# Patient Record
Sex: MALE | Race: WHITE | NOT HISPANIC OR LATINO | Employment: FULL TIME | URBAN - METROPOLITAN AREA
[De-identification: names, ages, dates, MRNs, and addresses within clinical notes are randomized per-mention and may not be internally consistent; named-entity substitution may affect disease eponyms.]

---
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
## 2019-07-11 ENCOUNTER — HOSPITAL ENCOUNTER (OUTPATIENT)
Dept: CARDIOLOGY | Facility: MEDICAL CENTER | Age: 19
End: 2019-07-11
Attending: INTERNAL MEDICINE
Payer: COMMERCIAL

## 2019-07-11 DIAGNOSIS — R00.2 PALPITATIONS: ICD-10-CM

## 2019-07-11 LAB
LV EJECT FRACT MOD 2C 99903: 64.54
LV EJECT FRACT MOD 4C 99902: 67.89
LV EJECT FRACT MOD BP 99901: 64.41

## 2019-07-11 PROCEDURE — 93306 TTE W/DOPPLER COMPLETE: CPT

## 2019-11-04 ENCOUNTER — HOSPITAL ENCOUNTER (OUTPATIENT)
Dept: LAB | Facility: MEDICAL CENTER | Age: 19
End: 2019-11-04
Attending: FAMILY MEDICINE
Payer: COMMERCIAL

## 2019-11-04 LAB
ALBUMIN SERPL BCP-MCNC: 4.4 G/DL (ref 3.2–4.9)
ALBUMIN/GLOB SERPL: 1.5 G/DL
ALP SERPL-CCNC: 88 U/L (ref 30–99)
ALT SERPL-CCNC: 13 U/L (ref 2–50)
ANION GAP SERPL CALC-SCNC: 7 MMOL/L (ref 0–11.9)
AST SERPL-CCNC: 12 U/L (ref 12–45)
BASOPHILS # BLD AUTO: 0.6 % (ref 0–1.8)
BASOPHILS # BLD: 0.03 K/UL (ref 0–0.12)
BILIRUB SERPL-MCNC: 0.6 MG/DL (ref 0.1–1.5)
BUN SERPL-MCNC: 18 MG/DL (ref 8–22)
CALCIUM SERPL-MCNC: 9.8 MG/DL (ref 8.5–10.5)
CHLORIDE SERPL-SCNC: 106 MMOL/L (ref 96–112)
CHOLEST SERPL-MCNC: 133 MG/DL (ref 100–199)
CO2 SERPL-SCNC: 27 MMOL/L (ref 20–33)
CREAT SERPL-MCNC: 0.93 MG/DL (ref 0.5–1.4)
EOSINOPHIL # BLD AUTO: 0.18 K/UL (ref 0–0.51)
EOSINOPHIL NFR BLD: 3.7 % (ref 0–6.9)
ERYTHROCYTE [DISTWIDTH] IN BLOOD BY AUTOMATED COUNT: 42.1 FL (ref 35.9–50)
FASTING STATUS PATIENT QL REPORTED: NORMAL
GLOBULIN SER CALC-MCNC: 3 G/DL (ref 1.9–3.5)
GLUCOSE SERPL-MCNC: 90 MG/DL (ref 65–99)
HCT VFR BLD AUTO: 49.1 % (ref 42–52)
HDLC SERPL-MCNC: 56 MG/DL
HGB BLD-MCNC: 16.2 G/DL (ref 14–18)
IMM GRANULOCYTES # BLD AUTO: 0.01 K/UL (ref 0–0.11)
IMM GRANULOCYTES NFR BLD AUTO: 0.2 % (ref 0–0.9)
LDLC SERPL CALC-MCNC: 70 MG/DL
LYMPHOCYTES # BLD AUTO: 1.9 K/UL (ref 1–4.8)
LYMPHOCYTES NFR BLD: 39.3 % (ref 22–41)
MCH RBC QN AUTO: 29.5 PG (ref 27–33)
MCHC RBC AUTO-ENTMCNC: 33 G/DL (ref 33.7–35.3)
MCV RBC AUTO: 89.3 FL (ref 81.4–97.8)
MONOCYTES # BLD AUTO: 0.46 K/UL (ref 0–0.85)
MONOCYTES NFR BLD AUTO: 9.5 % (ref 0–13.4)
NEUTROPHILS # BLD AUTO: 2.25 K/UL (ref 1.82–7.42)
NEUTROPHILS NFR BLD: 46.7 % (ref 44–72)
NRBC # BLD AUTO: 0 K/UL
NRBC BLD-RTO: 0 /100 WBC
PLATELET # BLD AUTO: 184 K/UL (ref 164–446)
PMV BLD AUTO: 11.8 FL (ref 9–12.9)
POTASSIUM SERPL-SCNC: 4.2 MMOL/L (ref 3.6–5.5)
PROT SERPL-MCNC: 7.4 G/DL (ref 6–8.2)
RBC # BLD AUTO: 5.5 M/UL (ref 4.7–6.1)
SODIUM SERPL-SCNC: 140 MMOL/L (ref 135–145)
TRIGL SERPL-MCNC: 36 MG/DL (ref 0–149)
TSH SERPL DL<=0.005 MIU/L-ACNC: 1.58 UIU/ML (ref 0.38–5.33)
WBC # BLD AUTO: 4.8 K/UL (ref 4.8–10.8)

## 2019-11-04 PROCEDURE — 80061 LIPID PANEL: CPT

## 2019-11-04 PROCEDURE — 84443 ASSAY THYROID STIM HORMONE: CPT

## 2019-11-04 PROCEDURE — 85025 COMPLETE CBC W/AUTO DIFF WBC: CPT

## 2019-11-04 PROCEDURE — 36415 COLL VENOUS BLD VENIPUNCTURE: CPT

## 2019-11-04 PROCEDURE — 80053 COMPREHEN METABOLIC PANEL: CPT

## 2019-12-04 ENCOUNTER — HOSPITAL ENCOUNTER (OUTPATIENT)
Dept: RADIOLOGY | Facility: MEDICAL CENTER | Age: 19
End: 2019-12-04
Payer: COMMERCIAL

## 2019-12-04 DIAGNOSIS — I51.7 ATRIAL DILATATION: ICD-10-CM

## 2019-12-04 PROCEDURE — A9577 INJ MULTIHANCE: HCPCS | Performed by: INTERNAL MEDICINE

## 2019-12-04 PROCEDURE — 700117 HCHG RX CONTRAST REV CODE 255: Performed by: INTERNAL MEDICINE

## 2019-12-04 PROCEDURE — 75561 CARDIAC MRI FOR MORPH W/DYE: CPT

## 2019-12-04 RX ADMIN — GADOBENATE DIMEGLUMINE 20 ML: 529 INJECTION, SOLUTION INTRAVENOUS at 15:30

## 2020-01-13 ENCOUNTER — APPOINTMENT (RX ONLY)
Dept: URBAN - METROPOLITAN AREA CLINIC 4 | Facility: CLINIC | Age: 20
Setting detail: DERMATOLOGY
End: 2020-01-13

## 2020-01-13 DIAGNOSIS — L90.5 SCAR CONDITIONS AND FIBROSIS OF SKIN: ICD-10-CM

## 2020-01-13 DIAGNOSIS — L70.0 ACNE VULGARIS: ICD-10-CM

## 2020-01-13 PROCEDURE — ? COUNSELING

## 2020-01-13 PROCEDURE — ? TREATMENT REGIMEN

## 2020-01-13 PROCEDURE — 99202 OFFICE O/P NEW SF 15 MIN: CPT

## 2020-01-13 PROCEDURE — ? MEDICATION COUNSELING

## 2020-01-13 PROCEDURE — ? PATIENT SPECIFIC COUNSELING

## 2020-01-13 ASSESSMENT — LOCATION SIMPLE DESCRIPTION DERM
LOCATION SIMPLE: RIGHT CHEEK
LOCATION SIMPLE: LEFT CHEEK

## 2020-01-13 ASSESSMENT — LOCATION DETAILED DESCRIPTION DERM
LOCATION DETAILED: RIGHT CENTRAL MALAR CHEEK
LOCATION DETAILED: LEFT CENTRAL MALAR CHEEK
LOCATION DETAILED: LEFT INFERIOR CENTRAL MALAR CHEEK

## 2020-01-13 ASSESSMENT — LOCATION ZONE DERM: LOCATION ZONE: FACE

## 2020-01-13 NOTE — PROCEDURE: TREATMENT REGIMEN
Discontinue Regimen: Proactive X Out
Hide Panoxyl Products: No
Action 3: Continue
Samples Given: Avar / Micro RetinA / CereVe
Initiate Regimen: Avar q AM & Micro RetinA q PM
Plan: Will try samples first. If he likes them, will call office to send rx.
Detail Level: Zone

## 2020-01-13 NOTE — PROCEDURE: MEDICATION COUNSELING
Azithromycin Counseling:  I discussed with the patient the risks of azithromycin including but not limited to GI upset, allergic reaction, drug rash, diarrhea, and yeast infections.
Rhofade Counseling: Rhofade is a topical medication which can decrease superficial blood flow where applied. Side effects are uncommon and include stinging, redness and allergic reactions.
Stelara Pregnancy And Lactation Text: This medication is Pregnancy Category B and is considered safe during pregnancy. It is unknown if this medication is excreted in breast milk.
Methotrexate Pregnancy And Lactation Text: This medication is Pregnancy Category X and is known to cause fetal harm. This medication is excreted in breast milk.
Hydroxyzine Counseling: Patient advised that the medication is sedating and not to drive a car after taking this medication.  Patient informed of potential adverse effects including but not limited to dry mouth, urinary retention, and blurry vision.  The patient verbalized understanding of the proper use and possible adverse effects of hydroxyzine.  All of the patient's questions and concerns were addressed.
Rifampin Pregnancy And Lactation Text: This medication is Pregnancy Category C and it isn't know if it is safe during pregnancy. It is also excreted in breast milk and should not be used if you are breast feeding.
Niacinamide Counseling: I recommended taking niacin or niacinamide, also know as vitamin B3, twice daily. Recent evidence suggests that taking vitamin B3 (500 mg twice daily) can reduce the risk of actinic keratoses and non-melanoma skin cancers. Side effects of vitamin B3 include flushing and headache.
Valtrex Pregnancy And Lactation Text: this medication is Pregnancy Category B and is considered safe during pregnancy. This medication is not directly found in breast milk but it's metabolite acyclovir is present.
Thalidomide Pregnancy And Lactation Text: This medication is Pregnancy Category X and is absolutely contraindicated during pregnancy. It is unknown if it is excreted in breast milk.
Drysol Counseling:  I discussed with the patient the risks of drysol/aluminum chloride including but not limited to skin rash, itching, irritation, burning.
Rhofade Pregnancy And Lactation Text: This medication has not been assigned a Pregnancy Risk Category. It is unknown if the medication is excreted in breast milk.
Taltz Counseling: I discussed with the patient the risks of ixekizumab including but not limited to immunosuppression, serious infections, worsening of inflammatory bowel disease and drug reactions.  The patient understands that monitoring is required including a PPD at baseline and must alert us or the primary physician if symptoms of infection or other concerning signs are noted.
Humira Counseling:  I discussed with the patient the risks of adalimumab including but not limited to myelosuppression, immunosuppression, autoimmune hepatitis, demyelinating diseases, lymphoma, and serious infections.  The patient understands that monitoring is required including a PPD at baseline and must alert us or the primary physician if symptoms of infection or other concerning signs are noted.
Arava Counseling:  Patient counseled regarding adverse effects of Arava including but not limited to nausea, vomiting, abnormalities in liver function tests. Patients may develop mouth sores, rash, diarrhea, and abnormalities in blood counts. The patient understands that monitoring is required including LFTs and blood counts.  There is a rare possibility of scarring of the liver and lung problems that can occur when taking methotrexate. Persistent nausea, loss of appetite, pale stools, dark urine, cough, and shortness of breath should be reported immediately. Patient advised to discontinue Arava treatment and consult with a physician prior to attempting conception. The patient will have to undergo a treatment to eliminate Arava from the body prior to conception.
Doxepin Pregnancy And Lactation Text: This medication is Pregnancy Category C and it isn't known if it is safe during pregnancy. It is also excreted in breast milk and breast feeding isn't recommended.
Valtrex Counseling: I discussed with the patient the risks of valacyclovir including but not limited to kidney damage, nausea, vomiting and severe allergy.  The patient understands that if the infection seems to be worsening or is not improving, they are to call.
Prednisone Counseling:  I discussed with the patient the risks of prolonged use of prednisone including but not limited to weight gain, insomnia, osteoporosis, mood changes, diabetes, susceptibility to infection, glaucoma and high blood pressure.  In cases where prednisone use is prolonged, patients should be monitored with blood pressure checks, serum glucose levels and an eye exam.  Additionally, the patient may need to be placed on GI prophylaxis, PCP prophylaxis, and calcium and vitamin D supplementation and/or a bisphosphonate.  The patient verbalized understanding of the proper use and the possible adverse effects of prednisone.  All of the patient's questions and concerns were addressed.
Niacinamide Pregnancy And Lactation Text: These medications are considered safe during pregnancy.
Tranexamic Acid Counseling:  Patient advised of the small risk of bleeding problems with tranexamic acid. They were also instructed to call if they developed any nausea, vomiting or diarrhea. All of the patient's questions and concerns were addressed.
Rifampin Counseling: I discussed with the patient the risks of rifampin including but not limited to liver damage, kidney damage, red-orange body fluids, nausea/vomiting and severe allergy.
Drysol Pregnancy And Lactation Text: This medication is considered safe during pregnancy and breast feeding.
Taltz Pregnancy And Lactation Text: The risk during pregnancy and breastfeeding is uncertain with this medication.
Solaraze Counseling:  I discussed with the patient the risks of Solaraze including but not limited to erythema, scaling, itching, weeping, crusting, and pain.
Prednisone Pregnancy And Lactation Text: This medication is Pregnancy Category C and it isn't know if it is safe during pregnancy. This medication is excreted in breast milk.
Doxepin Counseling:  Patient advised that the medication is sedating and not to drive a car after taking this medication. Patient informed of potential adverse effects including but not limited to dry mouth, urinary retention, and blurry vision.  The patient verbalized understanding of the proper use and possible adverse effects of doxepin.  All of the patient's questions and concerns were addressed.
Nsaids Counseling: NSAID Counseling: I discussed with the patient that NSAIDs should be taken with food. Prolonged use of NSAIDs can result in the development of stomach ulcers.  Patient advised to stop taking NSAIDs if abdominal pain occurs.  The patient verbalized understanding of the proper use and possible adverse effects of NSAIDs.  All of the patient's questions and concerns were addressed.
Quinolones Pregnancy And Lactation Text: This medication is Pregnancy Category C and it isn't know if it is safe during pregnancy. It is also excreted in breast milk.
Tranexamic Acid Pregnancy And Lactation Text: It is unknown if this medication is safe during pregnancy or breast feeding.
Elidel Counseling: Patient may experience a mild burning sensation during topical application. Elidel is not approved in children less than 2 years of age. There have been case reports of hematologic and skin malignancies in patients using topical calcineurin inhibitors although causality is questionable.
Tremfya Counseling: I discussed with the patient the risks of guselkumab including but not limited to immunosuppression, serious infections, worsening of inflammatory bowel disease and drug reactions.  The patient understands that monitoring is required including a PPD at baseline and must alert us or the primary physician if symptoms of infection or other concerning signs are noted.
Solaraze Pregnancy And Lactation Text: This medication is Pregnancy Category B and is considered safe. There is some data to suggest avoiding during the third trimester. It is unknown if this medication is excreted in breast milk.
Enbrel Counseling:  I discussed with the patient the risks of etanercept including but not limited to myelosuppression, immunosuppression, autoimmune hepatitis, demyelinating diseases, lymphoma, and infections.  The patient understands that monitoring is required including a PPD at baseline and must alert us or the primary physician if symptoms of infection or other concerning signs are noted.
Cimetidine Pregnancy And Lactation Text: This medication is Pregnancy Category B and is considered safe during pregnancy. It is also excreted in breast milk and breast feeding isn't recommended.
Clofazimine Counseling:  I discussed with the patient the risks of clofazimine including but not limited to skin and eye pigmentation, liver damage, nausea/vomiting, gastrointestinal bleeding and allergy.
Quinolones Counseling:  I discussed with the patient the risks of fluoroquinolones including but not limited to GI upset, allergic reaction, drug rash, diarrhea, dizziness, photosensitivity, yeast infections, liver function test abnormalities, tendonitis/tendon rupture.
Nsaids Pregnancy And Lactation Text: These medications are considered safe up to 30 weeks gestation. It is excreted in breast milk.
Topical Retinoid counseling:  Patient advised to apply a pea-sized amount only at bedtime and wait 30 minutes after washing their face before applying.  If too drying, patient may add a non-comedogenic moisturizer. The patient verbalized understanding of the proper use and possible adverse effects of retinoids.  All of the patient's questions and concerns were addressed.
Dupixent Pregnancy And Lactation Text: This medication likely crosses the placenta but the risk for the fetus is uncertain. This medication is excreted in breast milk.
Clofazimine Pregnancy And Lactation Text: This medication is Pregnancy Category C and isn't considered safe during pregnancy. It is excreted in breast milk.
Acitretin Counseling:  I discussed with the patient the risks of acitretin including but not limited to hair loss, dry lips/skin/eyes, liver damage, hyperlipidemia, depression/suicidal ideation, photosensitivity.  Serious rare side effects can include but are not limited to pancreatitis, pseudotumor cerebri, bony changes, clot formation/stroke/heart attack.  Patient understands that alcohol is contraindicated since it can result in liver toxicity and significantly prolong the elimination of the drug by many years.
Cimetidine Counseling:  I discussed with the patient the risks of Cimetidine including but not limited to gynecomastia, headache, diarrhea, nausea, drowsiness, arrhythmias, pancreatitis, skin rashes, psychosis, bone marrow suppression and kidney toxicity.
Odomzo Counseling- I discussed with the patient the risks of Odomzo including but not limited to nausea, vomiting, diarrhea, constipation, weight loss, changes in the sense of taste, decreased appetite, muscle spasms, and hair loss.  The patient verbalized understanding of the proper use and possible adverse effects of Odomzo.  All of the patient's questions and concerns were addressed.
Elidel Pregnancy And Lactation Text: This medication is Pregnancy Category C. It is unknown if this medication is excreted in breast milk.
Minocycline Pregnancy And Lactation Text: This medication is Pregnancy Category D and not consider safe during pregnancy. It is also excreted in breast milk.
Include Pregnancy/Lactation Warning?: No
Xeljanz Counseling: I discussed with the patient the risks of Xeljanz therapy including increased risk of infection, liver issues, headache, diarrhea, or cold symptoms. Live vaccines should be avoided. They were instructed to call if they have any problems.
Dupixent Counseling: I discussed with the patient the risks of dupilumab including but not limited to eye infection and irritation, cold sores, injection site reactions, worsening of asthma, allergic reactions and increased risk of parasitic infection.  Live vaccines should be avoided while taking dupilumab. Dupilumab will also interact with certain medications such as warfarin and cyclosporine. The patient understands that monitoring is required and they must alert us or the primary physician if symptoms of infection or other concerning signs are noted.
Acitretin Pregnancy And Lactation Text: This medication is Pregnancy Category X and should not be given to women who are pregnant or may become pregnant in the future. This medication is excreted in breast milk.
Colchicine Counseling:  Patient counseled regarding adverse effects including but not limited to stomach upset (nausea, vomiting, stomach pain, or diarrhea).  Patient instructed to limit alcohol consumption while taking this medication.  Colchicine may reduce blood counts especially with prolonged use.  The patient understands that monitoring of kidney function and blood counts may be required, especially at baseline. The patient verbalized understanding of the proper use and possible adverse effects of colchicine.  All of the patient's questions and concerns were addressed.
Eucrisa Counseling: Patient may experience a mild burning sensation during topical application. Eucrisa is not approved in children less than 2 years of age.
Minocycline Counseling: Patient advised regarding possible photosensitivity and discoloration of the teeth, skin, lips, tongue and gums.  Patient instructed to avoid sunlight, if possible.  When exposed to sunlight, patients should wear protective clothing, sunglasses, and sunscreen.  The patient was instructed to call the office immediately if the following severe adverse effects occur:  hearing changes, easy bruising/bleeding, severe headache, or vision changes.  The patient verbalized understanding of the proper use and possible adverse effects of minocycline.  All of the patient's questions and concerns were addressed.
Xelmarvinz Pregnancy And Lactation Text: This medication is Pregnancy Category D and is not considered safe during pregnancy.  The risk during breast feeding is also uncertain.
Tazorac Counseling:  Patient advised that medication is irritating and drying.  Patient may need to apply sparingly and wash off after an hour before eventually leaving it on overnight.  The patient verbalized understanding of the proper use and possible adverse effects of tazorac.  All of the patient's questions and concerns were addressed.
Bexarotene Counseling:  I discussed with the patient the risks of bexarotene including but not limited to hair loss, dry lips/skin/eyes, liver abnormalities, hyperlipidemia, pancreatitis, depression/suicidal ideation, photosensitivity, drug rash/allergic reactions, hypothyroidism, anemia, leukopenia, infection, cataracts, and teratogenicity.  Patient understands that they will need regular blood tests to check lipid profile, liver function tests, white blood cell count, thyroid function tests and pregnancy test if applicable.
Metronidazole Pregnancy And Lactation Text: This medication is Pregnancy Category B and considered safe during pregnancy.  It is also excreted in breast milk.
Eucrisa Pregnancy And Lactation Text: This medication has not been assigned a Pregnancy Risk Category but animal studies failed to show danger with the topical medication. It is unknown if the medication is excreted in breast milk.
Otezla Counseling: The side effects of Otezla were discussed with the patient, including but not limited to worsening or new depression, weight loss, diarrhea, nausea, upper respiratory tract infection, and headache. Patient instructed to call the office should any adverse effect occur.  The patient verbalized understanding of the proper use and possible adverse effects of Otezla.  All the patient's questions and concerns were addressed.
Detail Level: Zone
Xolair Counseling:  Patient informed of potential adverse effects including but not limited to fever, muscle aches, rash and allergic reactions.  The patient verbalized understanding of the proper use and possible adverse effects of Xolair.  All of the patient's questions and concerns were addressed.
Tazorac Pregnancy And Lactation Text: This medication is not safe during pregnancy. It is unknown if this medication is excreted in breast milk.
Cosentyx Counseling:  I discussed with the patient the risks of Cosentyx including but not limited to worsening of Crohn's disease, immunosuppression, allergic reactions and infections.  The patient understands that monitoring is required including a PPD at baseline and must alert us or the primary physician if symptoms of infection or other concerning signs are noted.
Dapsone Counseling: I discussed with the patient the risks of dapsone including but not limited to hemolytic anemia, agranulocytosis, rashes, methemoglobinemia, kidney failure, peripheral neuropathy, headaches, GI upset, and liver toxicity.  Patients who start dapsone require monitoring including baseline LFTs and weekly CBCs for the first month, then every month thereafter.  The patient verbalized understanding of the proper use and possible adverse effects of dapsone.  All of the patient's questions and concerns were addressed.
Terbinafine Counseling: Patient counseling regarding adverse effects of terbinafine including but not limited to headache, diarrhea, rash, upset stomach, liver function test abnormalities, itching, taste/smell disturbance, nausea, abdominal pain, and flatulence.  There is a rare possibility of liver failure that can occur when taking terbinafine.  The patient understands that a baseline LFT and kidney function test may be required. The patient verbalized understanding of the proper use and possible adverse effects of terbinafine.  All of the patient's questions and concerns were addressed.
Bexarotene Pregnancy And Lactation Text: This medication is Pregnancy Category X and should not be given to women who are pregnant or may become pregnant. This medication should not be used if you are breast feeding.
Otezla Pregnancy And Lactation Text: This medication is Pregnancy Category C and it isn't known if it is safe during pregnancy. It is unknown if it is excreted in breast milk.
Hydroquinone Counseling:  Patient advised that medication may result in skin irritation, lightening (hypopigmentation), dryness, and burning.  In the event of skin irritation, the patient was advised to reduce the amount of the drug applied or use it less frequently.  Rarely, spots that are treated with hydroquinone can become darker (pseudoochronosis).  Should this occur, patient instructed to stop medication and call the office. The patient verbalized understanding of the proper use and possible adverse effects of hydroquinone.  All of the patient's questions and concerns were addressed.
Metronidazole Counseling:  I discussed with the patient the risks of metronidazole including but not limited to seizures, nausea/vomiting, a metallic taste in the mouth, nausea/vomiting and severe allergy.
Topical Clindamycin Counseling: Patient counseled that this medication may cause skin irritation or allergic reactions.  In the event of skin irritation, the patient was advised to reduce the amount of the drug applied or use it less frequently.   The patient verbalized understanding of the proper use and possible adverse effects of clindamycin.  All of the patient's questions and concerns were addressed.
Xolair Pregnancy And Lactation Text: This medication is Pregnancy Category B and is considered safe during pregnancy. This medication is excreted in breast milk.
Cimzia Pregnancy And Lactation Text: This medication crosses the placenta but can be considered safe in certain situations. Cimzia may be excreted in breast milk.
Isotretinoin Counseling: Patient should get monthly blood tests, not donate blood, not drive at night if vision affected, not share medication, and not undergo elective surgery for 6 months after tx completed. Side effects reviewed, pt to contact office should one occur.
Ketoconazole Pregnancy And Lactation Text: This medication is Pregnancy Category C and it isn't know if it is safe during pregnancy. It is also excreted in breast milk and breast feeding isn't recommended.
Dapsone Pregnancy And Lactation Text: This medication is Pregnancy Category C and is not considered safe during pregnancy or breast feeding.
Erythromycin Pregnancy And Lactation Text: This medication is Pregnancy Category B and is considered safe during pregnancy. It is also excreted in breast milk.
Oxybutynin Counseling:  I discussed with the patient the risks of oxybutynin including but not limited to skin rash, drowsiness, dry mouth, difficulty urinating, and blurred vision.
Topical Clindamycin Pregnancy And Lactation Text: This medication is Pregnancy Category B and is considered safe during pregnancy. It is unknown if it is excreted in breast milk.
Erivedge Counseling- I discussed with the patient the risks of Erivedge including but not limited to nausea, vomiting, diarrhea, constipation, weight loss, changes in the sense of taste, decreased appetite, muscle spasms, and hair loss.  The patient verbalized understanding of the proper use and possible adverse effects of Erivedge.  All of the patient's questions and concerns were addressed.
Ketoconazole Counseling:   Patient counseled regarding improving absorption with orange juice.  Adverse effects include but are not limited to breast enlargement, headache, diarrhea, nausea, upset stomach, liver function test abnormalities, taste disturbance, and stomach pain.  There is a rare possibility of liver failure that can occur when taking ketoconazole. The patient understands that monitoring of LFTs may be required, especially at baseline. The patient verbalized understanding of the proper use and possible adverse effects of ketoconazole.  All of the patient's questions and concerns were addressed.
Isotretinoin Pregnancy And Lactation Text: This medication is Pregnancy Category X and is considered extremely dangerous during pregnancy. It is unknown if it is excreted in breast milk.
Imiquimod Counseling:  I discussed with the patient the risks of imiquimod including but not limited to erythema, scaling, itching, weeping, crusting, and pain.  Patient understands that the inflammatory response to imiquimod is variable from person to person and was educated regarded proper titration schedule.  If flu-like symptoms develop, patient knows to discontinue the medication and contact us.
Erythromycin Counseling:  I discussed with the patient the risks of erythromycin including but not limited to GI upset, allergic reaction, drug rash, diarrhea, increase in liver enzymes, and yeast infections.
Cimzia Counseling:  I discussed with the patient the risks of Cimzia including but not limited to immunosuppression, allergic reactions and infections.  The patient understands that monitoring is required including a PPD at baseline and must alert us or the primary physician if symptoms of infection or other concerning signs are noted.
Rituxan Counseling:  I discussed with the patient the risks of Rituxan infusions. Side effects can include infusion reactions, severe drug rashes including mucocutaneous reactions, reactivation of latent hepatitis and other infections and rarely progressive multifocal leukoencephalopathy.  All of the patient's questions and concerns were addressed.
Azathioprine Counseling:  I discussed with the patient the risks of azathioprine including but not limited to myelosuppression, immunosuppression, hepatotoxicity, lymphoma, and infections.  The patient understands that monitoring is required including baseline LFTs, Creatinine, possible TPMP genotyping and weekly CBCs for the first month and then every 2 weeks thereafter.  The patient verbalized understanding of the proper use and possible adverse effects of azathioprine.  All of the patient's questions and concerns were addressed.
Topical Sulfur Applications Counseling: Topical Sulfur Counseling: Patient counseled that this medication may cause skin irritation or allergic reactions.  In the event of skin irritation, the patient was advised to reduce the amount of the drug applied or use it less frequently.   The patient verbalized understanding of the proper use and possible adverse effects of topical sulfur application.  All of the patient's questions and concerns were addressed.
High Dose Vitamin A Counseling: Side effects reviewed, pt to contact office should one occur.
Propranolol Counseling:  I discussed with the patient the risks of propranolol including but not limited to low heart rate, low blood pressure, low blood sugar, restlessness and increased cold sensitivity. They should call the office if they experience any of these side effects.
Doxycycline Pregnancy And Lactation Text: This medication is Pregnancy Category D and not consider safe during pregnancy. It is also excreted in breast milk but is considered safe for shorter treatment courses.
Rituxan Pregnancy And Lactation Text: This medication is Pregnancy Category C and it isn't know if it is safe during pregnancy. It is unknown if this medication is excreted in breast milk but similar antibodies are known to be excreted.
Topical Sulfur Applications Pregnancy And Lactation Text: This medication is Pregnancy Category C and has an unknown safety profile during pregnancy. It is unknown if this topical medication is excreted in breast milk.
Azathioprine Pregnancy And Lactation Text: This medication is Pregnancy Category D and isn't considered safe during pregnancy. It is unknown if this medication is excreted in breast milk.
Itraconazole Counseling:  I discussed with the patient the risks of itraconazole including but not limited to liver damage, nausea/vomiting, neuropathy, and severe allergy.  The patient understands that this medication is best absorbed when taken with acidic beverages such as non-diet cola or ginger ale.  The patient understands that monitoring is required including baseline LFTs and repeat LFTs at intervals.  The patient understands that they are to contact us or the primary physician if concerning signs are noted.
High Dose Vitamin A Pregnancy And Lactation Text: High dose vitamin A therapy is contraindicated during pregnancy and breast feeding.
Finasteride Male Counseling: Finasteride Counseling:  I discussed with the patient the risks of use of finasteride including but not limited to decreased libido, decreased ejaculate volume, gynecomastia, and depression. Women should not handle medication.  All of the patient's questions and concerns were addressed.
Minoxidil Counseling: Minoxidil is a topical medication which can increase blood flow where it is applied. It is uncertain how this medication increases hair growth. Side effects are uncommon and include stinging and allergic reactions.
Doxycycline Counseling:  Patient counseled regarding possible photosensitivity and increased risk for sunburn.  Patient instructed to avoid sunlight, if possible.  When exposed to sunlight, patients should wear protective clothing, sunglasses, and sunscreen.  The patient was instructed to call the office immediately if the following severe adverse effects occur:  hearing changes, easy bruising/bleeding, severe headache, or vision changes.  The patient verbalized understanding of the proper use and possible adverse effects of doxycycline.  All of the patient's questions and concerns were addressed.
Infliximab Counseling:  I discussed with the patient the risks of infliximab including but not limited to myelosuppression, immunosuppression, autoimmune hepatitis, demyelinating diseases, lymphoma, and serious infections.  The patient understands that monitoring is required including a PPD at baseline and must alert us or the primary physician if symptoms of infection or other concerning signs are noted.
Siliq Counseling:  I discussed with the patient the risks of Siliq including but not limited to new or worsening depression, suicidal thoughts and behavior, immunosuppression, malignancy, posterior leukoencephalopathy syndrome, and serious infections.  The patient understands that monitoring is required including a PPD at baseline and must alert us or the primary physician if symptoms of infection or other concerning signs are noted. There is also a special program designed to monitor depression which is required with Siliq.
Propranolol Pregnancy And Lactation Text: This medication is Pregnancy Category C and it isn't known if it is safe during pregnancy. It is excreted in breast milk.
Wartpeel Counseling:  I discussed with the patient the risks of Wartpeel including but not limited to erythema, scaling, itching, weeping, crusting, and pain.
Cellcept Counseling:  I discussed with the patient the risks of mycophenolate mofetil including but not limited to infection/immunosuppression, GI upset, hypokalemia, hypercholesterolemia, bone marrow suppression, lymphoproliferative disorders, malignancy, GI ulceration/bleed/perforation, colitis, interstitial lung disease, kidney failure, progressive multifocal leukoencephalopathy, and birth defects.  The patient understands that monitoring is required including a baseline creatinine and regular CBC testing. In addition, patient must alert us immediately if symptoms of infection or other concerning signs are noted.
Griseofulvin Pregnancy And Lactation Text: This medication is Pregnancy Category X and is known to cause serious birth defects. It is unknown if this medication is excreted in breast milk but breast feeding should be avoided.
Clindamycin Pregnancy And Lactation Text: This medication can be used in pregnancy if certain situations. Clindamycin is also present in breast milk.
Finasteride Pregnancy And Lactation Text: This medication is absolutely contraindicated during pregnancy. It is unknown if it is excreted in breast milk.
Birth Control Pills Counseling: Birth Control Pill Counseling: I discussed with the patient the potential side effects of OCPs including but not limited to increased risk of stroke, heart attack, thrombophlebitis, deep venous thrombosis, hepatic adenomas, breast changes, GI upset, headaches, and depression.  The patient verbalized understanding of the proper use and possible adverse effects of OCPs. All of the patient's questions and concerns were addressed.
Mirvaso Counseling: Mirvaso is a topical medication which can decrease superficial blood flow where applied. Side effects are uncommon and include stinging, redness and allergic reactions.
Clindamycin Counseling: I counseled the patient regarding use of clindamycin as an antibiotic for prophylactic and/or therapeutic purposes. Clindamycin is active against numerous classes of bacteria, including skin bacteria. Side effects may include nausea, diarrhea, gastrointestinal upset, rash, hives, yeast infections, and in rare cases, colitis.
Ivermectin Pregnancy And Lactation Text: This medication is Pregnancy Category C and it isn't known if it is safe during pregnancy. It is also excreted in breast milk.
Wartpeel Pregnancy And Lactation Text: This medication is Pregnancy Category X and contraindicated in pregnancy and in women who may become pregnant. It is unknown if this medication is excreted in breast milk.
Gabapentin Counseling: I discussed with the patient the risks of gabapentin including but not limited to dizziness, somnolence, fatigue and ataxia.
Griseofulvin Counseling:  I discussed with the patient the risks of griseofulvin including but not limited to photosensitivity, cytopenia, liver damage, nausea/vomiting and severe allergy.  The patient understands that this medication is best absorbed when taken with a fatty meal (e.g., ice cream or french fries).
Birth Control Pills Pregnancy And Lactation Text: This medication should be avoided if pregnant and for the first 30 days post-partum.
Cyclophosphamide Counseling:  I discussed with the patient the risks of cyclophosphamide including but not limited to hair loss, hormonal abnormalities, decreased fertility, abdominal pain, diarrhea, nausea and vomiting, bone marrow suppression and infection. The patient understands that monitoring is required while taking this medication.
Benzoyl Peroxide Counseling: Patient counseled that medicine may cause skin irritation and bleach clothing.  In the event of skin irritation, the patient was advised to reduce the amount of the drug applied or use it less frequently.   The patient verbalized understanding of the proper use and possible adverse effects of benzoyl peroxide.  All of the patient's questions and concerns were addressed.
Cephalexin Pregnancy And Lactation Text: This medication is Pregnancy Category B and considered safe during pregnancy.  It is also excreted in breast milk but can be used safely for shorter doses.
Simponi Counseling:  I discussed with the patient the risks of golimumab including but not limited to myelosuppression, immunosuppression, autoimmune hepatitis, demyelinating diseases, lymphoma, and serious infections.  The patient understands that monitoring is required including a PPD at baseline and must alert us or the primary physician if symptoms of infection or other concerning signs are noted.
Ivermectin Counseling:  Patient instructed to take medication on an empty stomach with a full glass of water.  Patient informed of potential adverse effects including but not limited to nausea, diarrhea, dizziness, itching, and swelling of the extremities or lymph nodes.  The patient verbalized understanding of the proper use and possible adverse effects of ivermectin.  All of the patient's questions and concerns were addressed.
Zyclara Counseling:  I discussed with the patient the risks of imiquimod including but not limited to erythema, scaling, itching, weeping, crusting, and pain.  Patient understands that the inflammatory response to imiquimod is variable from person to person and was educated regarded proper titration schedule.  If flu-like symptoms develop, patient knows to discontinue the medication and contact us.
Spironolactone Counseling: Patient advised regarding risks of diarrhea, abdominal pain, hyperkalemia, birth defects (for female patients), liver toxicity and renal toxicity. The patient may need blood work to monitor liver and kidney function and potassium levels while on therapy. The patient verbalized understanding of the proper use and possible adverse effects of spironolactone.  All of the patient's questions and concerns were addressed.
Cyclophosphamide Pregnancy And Lactation Text: This medication is Pregnancy Category D and it isn't considered safe during pregnancy. This medication is excreted in breast milk.
Benzoyl Peroxide Pregnancy And Lactation Text: This medication is Pregnancy Category C. It is unknown if benzoyl peroxide is excreted in breast milk.
Cephalexin Counseling: I counseled the patient regarding use of cephalexin as an antibiotic for prophylactic and/or therapeutic purposes. Cephalexin (commonly prescribed under brand name Keflex) is a cephalosporin antibiotic which is active against numerous classes of bacteria, including most skin bacteria. Side effects may include nausea, diarrhea, gastrointestinal upset, rash, hives, yeast infections, and in rare cases, hepatitis, kidney disease, seizures, fever, confusion, neurologic symptoms, and others. Patients with severe allergies to penicillin medications are cautioned that there is about a 10% incidence of cross-reactivity with cephalosporins. When possible, patients with penicillin allergies should use alternatives to cephalosporins for antibiotic therapy.
Picato Counseling:  I discussed with the patient the risks of Picato including but not limited to erythema, scaling, itching, weeping, crusting, and pain.
Glycopyrrolate Counseling:  I discussed with the patient the risks of glycopyrrolate including but not limited to skin rash, drowsiness, dry mouth, difficulty urinating, and blurred vision.
Fluconazole Counseling:  Patient counseled regarding adverse effects of fluconazole including but not limited to headache, diarrhea, nausea, upset stomach, liver function test abnormalities, taste disturbance, and stomach pain.  There is a rare possibility of liver failure that can occur when taking fluconazole.  The patient understands that monitoring of LFTs and kidney function test may be required, especially at baseline. The patient verbalized understanding of the proper use and possible adverse effects of fluconazole.  All of the patient's questions and concerns were addressed.
Spironolactone Pregnancy And Lactation Text: This medication can cause feminization of the male fetus and should be avoided during pregnancy. The active metabolite is also found in breast milk.
Carac Counseling:  I discussed with the patient the risks of Carac including but not limited to erythema, scaling, itching, weeping, crusting, and pain.
Skyrizi Counseling: I discussed with the patient the risks of risankizumab-rzaa including but not limited to immunosuppression, and serious infections.  The patient understands that monitoring is required including a PPD at baseline and must alert us or the primary physician if symptoms of infection or other concerning signs are noted.
Bactrim Pregnancy And Lactation Text: This medication is Pregnancy Category D and is known to cause fetal risk.  It is also excreted in breast milk.
Albendazole Counseling:  I discussed with the patient the risks of albendazole including but not limited to cytopenia, kidney damage, nausea/vomiting and severe allergy.  The patient understands that this medication is being used in an off-label manner.
Cyclosporine Counseling:  I discussed with the patient the risks of cyclosporine including but not limited to hypertension, gingival hyperplasia,myelosuppression, immunosuppression, liver damage, kidney damage, neurotoxicity, lymphoma, and serious infections. The patient understands that monitoring is required including baseline blood pressure, CBC, CMP, lipid panel and uric acid, and then 1-2 times monthly CMP and blood pressure.
Glycopyrrolate Pregnancy And Lactation Text: This medication is Pregnancy Category B and is considered safe during pregnancy. It is unknown if it is excreted breast milk.
SSKI Counseling:  I discussed with the patient the risks of SSKI including but not limited to thyroid abnormalities, metallic taste, GI upset, fever, headache, acne, arthralgias, paraesthesias, lymphadenopathy, easy bleeding, arrhythmias, and allergic reaction.
Bactrim Counseling:  I discussed with the patient the risks of sulfa antibiotics including but not limited to GI upset, allergic reaction, drug rash, diarrhea, dizziness, photosensitivity, and yeast infections.  Rarely, more serious reactions can occur including but not limited to aplastic anemia, agranulocytosis, methemoglobinemia, blood dyscrasias, liver or kidney failure, lung infiltrates or desquamative/blistering drug rashes.
Protopic Counseling: Patient may experience a mild burning sensation during topical application. Protopic is not approved in children less than 2 years of age. There have been case reports of hematologic and skin malignancies in patients using topical calcineurin inhibitors although causality is questionable.
Opioid Counseling: I discussed with the patient the potential side effects of opioids including but not limited to addiction, altered mental status, and depression. I stressed avoiding alcohol, benzodiazepines, muscle relaxants and sleep aids unless specifically okayed by a physician. The patient verbalized understanding of the proper use and possible adverse effects of opioids. All of the patient's questions and concerns were addressed. They were instructed to flush the remaining pills down the toilet if they did not need them for pain.
Hydroxychloroquine Counseling:  I discussed with the patient that a baseline ophthalmologic exam is needed at the start of therapy and every year thereafter while on therapy. A CBC may also be warranted for monitoring.  The side effects of this medication were discussed with the patient, including but not limited to agranulocytosis, aplastic anemia, seizures, rashes, retinopathy, and liver toxicity. Patient instructed to call the office should any adverse effect occur.  The patient verbalized understanding of the proper use and possible adverse effects of Plaquenil.  All the patient's questions and concerns were addressed.
Sski Pregnancy And Lactation Text: This medication is Pregnancy Category D and isn't considered safe during pregnancy. It is excreted in breast milk.
5-Fu Counseling: 5-Fluorouracil Counseling:  I discussed with the patient the risks of 5-fluorouracil including but not limited to erythema, scaling, itching, weeping, crusting, and pain.
Azithromycin Pregnancy And Lactation Text: This medication is considered safe during pregnancy and is also secreted in breast milk.
Stelara Counseling:  I discussed with the patient the risks of ustekinumab including but not limited to immunosuppression, malignancy, posterior leukoencephalopathy syndrome, and serious infections.  The patient understands that monitoring is required including a PPD at baseline and must alert us or the primary physician if symptoms of infection or other concerning signs are noted.
Protopic Pregnancy And Lactation Text: This medication is Pregnancy Category C. It is unknown if this medication is excreted in breast milk when applied topically.
Ilumya Counseling: I discussed with the patient the risks of tildrakizumab including but not limited to immunosuppression, malignancy, posterior leukoencephalopathy syndrome, and serious infections.  The patient understands that monitoring is required including a PPD at baseline and must alert us or the primary physician if symptoms of infection or other concerning signs are noted.
Opioid Pregnancy And Lactation Text: These medications can lead to premature delivery and should be avoided during pregnancy. These medications are also present in breast milk in small amounts.
Hydroxyzine Pregnancy And Lactation Text: This medication is not safe during pregnancy and should not be taken. It is also excreted in breast milk and breast feeding isn't recommended.
Methotrexate Counseling:  Patient counseled regarding adverse effects of methotrexate including but not limited to nausea, vomiting, abnormalities in liver function tests. Patients may develop mouth sores, rash, diarrhea, and abnormalities in blood counts. The patient understands that monitoring is required including LFT's and blood counts.  There is a rare possibility of scarring of the liver and lung problems that can occur when taking methotrexate. Persistent nausea, loss of appetite, pale stools, dark urine, cough, and shortness of breath should be reported immediately. Patient advised to discontinue methotrexate treatment at least three months before attempting to become pregnant.  I discussed the need for folate supplements while taking methotrexate.  These supplements can decrease side effects during methotrexate treatment. The patient verbalized understanding of the proper use and possible adverse effects of methotrexate.  All of the patient's questions and concerns were addressed.
Hydroxychloroquine Pregnancy And Lactation Text: This medication has been shown to cause fetal harm but it isn't assigned a Pregnancy Risk Category. There are small amounts excreted in breast milk.
Tetracycline Counseling: Patient counseled regarding possible photosensitivity and increased risk for sunburn.  Patient instructed to avoid sunlight, if possible.  When exposed to sunlight, patients should wear protective clothing, sunglasses, and sunscreen.  The patient was instructed to call the office immediately if the following severe adverse effects occur:  hearing changes, easy bruising/bleeding, severe headache, or vision changes.  The patient verbalized understanding of the proper use and possible adverse effects of tetracycline.  All of the patient's questions and concerns were addressed. Patient understands to avoid pregnancy while on therapy due to potential birth defects.
Thalidomide Counseling: I discussed with the patient the risks of thalidomide including but not limited to birth defects, anxiety, weakness, chest pain, dizziness, cough and severe allergy.

## 2020-11-02 ENCOUNTER — HOSPITAL ENCOUNTER (OUTPATIENT)
Dept: LAB | Facility: MEDICAL CENTER | Age: 20
End: 2020-11-02
Attending: FAMILY MEDICINE
Payer: COMMERCIAL

## 2020-11-02 PROCEDURE — C9803 HOPD COVID-19 SPEC COLLECT: HCPCS

## 2020-11-02 PROCEDURE — U0003 INFECTIOUS AGENT DETECTION BY NUCLEIC ACID (DNA OR RNA); SEVERE ACUTE RESPIRATORY SYNDROME CORONAVIRUS 2 (SARS-COV-2) (CORONAVIRUS DISEASE [COVID-19]), AMPLIFIED PROBE TECHNIQUE, MAKING USE OF HIGH THROUGHPUT TECHNOLOGIES AS DESCRIBED BY CMS-2020-01-R: HCPCS

## 2020-11-03 LAB
COVID ORDER STATUS COVID19: NORMAL
SARS-COV-2 RNA RESP QL NAA+PROBE: DETECTED
SPECIMEN SOURCE: ABNORMAL

## 2021-03-17 ENCOUNTER — HOSPITAL ENCOUNTER (OUTPATIENT)
Dept: CARDIOLOGY | Facility: MEDICAL CENTER | Age: 21
End: 2021-03-17
Payer: COMMERCIAL

## 2021-03-17 DIAGNOSIS — I48.91 ATRIAL FIBRILLATION, UNSPECIFIED TYPE (HCC): ICD-10-CM

## 2021-03-17 LAB
LV EJECT FRACT MOD 2C 99903: 57.7
LV EJECT FRACT MOD 4C 99902: 63.26
LV EJECT FRACT MOD BP 99901: 60.86

## 2021-03-17 PROCEDURE — 93325 DOPPLER ECHO COLOR FLOW MAPG: CPT

## 2021-03-17 PROCEDURE — 93325 DOPPLER ECHO COLOR FLOW MAPG: CPT | Mod: 26 | Performed by: STUDENT IN AN ORGANIZED HEALTH CARE EDUCATION/TRAINING PROGRAM

## 2021-03-17 PROCEDURE — 93321 DOPPLER ECHO F-UP/LMTD STD: CPT | Mod: 26 | Performed by: STUDENT IN AN ORGANIZED HEALTH CARE EDUCATION/TRAINING PROGRAM

## 2021-03-17 PROCEDURE — 93308 TTE F-UP OR LMTD: CPT | Mod: 26 | Performed by: STUDENT IN AN ORGANIZED HEALTH CARE EDUCATION/TRAINING PROGRAM

## 2022-10-12 ENCOUNTER — HOSPITAL ENCOUNTER (OUTPATIENT)
Dept: CARDIOLOGY | Facility: MEDICAL CENTER | Age: 22
End: 2022-10-12
Attending: INTERNAL MEDICINE
Payer: COMMERCIAL

## 2022-10-12 DIAGNOSIS — I48.91 ATRIAL FIBRILLATION, UNSPECIFIED TYPE (HCC): ICD-10-CM

## 2022-10-12 LAB
LV EJECT FRACT MOD 2C 99903: 67.16
LV EJECT FRACT MOD 4C 99902: 58.68
LV EJECT FRACT MOD BP 99901: 63.56

## 2022-10-12 PROCEDURE — 93306 TTE W/DOPPLER COMPLETE: CPT | Mod: 26 | Performed by: STUDENT IN AN ORGANIZED HEALTH CARE EDUCATION/TRAINING PROGRAM

## 2022-10-12 PROCEDURE — 93306 TTE W/DOPPLER COMPLETE: CPT

## 2023-01-18 ENCOUNTER — OFFICE VISIT (OUTPATIENT)
Dept: URGENT CARE | Facility: CLINIC | Age: 23
End: 2023-01-18
Payer: COMMERCIAL

## 2023-01-18 VITALS
OXYGEN SATURATION: 99 % | SYSTOLIC BLOOD PRESSURE: 118 MMHG | HEART RATE: 60 BPM | TEMPERATURE: 98.7 F | BODY MASS INDEX: 23.86 KG/M2 | RESPIRATION RATE: 16 BRPM | WEIGHT: 180 LBS | DIASTOLIC BLOOD PRESSURE: 70 MMHG | HEIGHT: 73 IN

## 2023-01-18 DIAGNOSIS — S89.92XA INJURY OF LEFT KNEE, INITIAL ENCOUNTER: ICD-10-CM

## 2023-01-18 PROCEDURE — 99203 OFFICE O/P NEW LOW 30 MIN: CPT | Performed by: NURSE PRACTITIONER

## 2023-01-19 NOTE — PROGRESS NOTES
Patient has consented to treatment and for use of patient information for treatment and billing purposes.    Date: 01/18/23     Arrival Mode: Private Vehicle    Chief Complaint:    Chief Complaint   Patient presents with    Knee Pain     X 8 days, LT Knee pain injury, heard a pop while getting up from under a truck.        History of Present Illness: 22 y.o.  male presents to clinic with 8 days of left knee pain.  Patient states he was going to stand up from being under a truck while working on it upon standing he felt a pop and pain in his left knee.  Patient states he has had continued pain with ambulation as well as extension and flexion.  Denies any previous injury.  Denies any numbness or tingling distal injury.      ROS:    As stated in HPI     Pertinent Medical History:  Past Medical History:   Diagnosis Date    Arrhythmia 2017    SVT        Pertinent Surgical History:  History reviewed. No pertinent surgical history.     Pertinent Medications:    No current outpatient medications on file prior to visit.     No current facility-administered medications on file prior to visit.        Allergies:    Patient has no known allergies.     Social History:  Social History     Tobacco Use    Smoking status: Never    Smokeless tobacco: Never   Vaping Use    Vaping Use: Never used   Substance Use Topics    Alcohol use: Yes     Comment: rare    Drug use: No        No LMP for male patient.           Physical Exam:    Vitals:    01/18/23 1715   BP: 118/70   Pulse: 60   Resp: 16   Temp: 37.1 °C (98.7 °F)   SpO2: 99%             Physical Exam  Constitutional:       Appearance: Normal appearance. He is not ill-appearing, toxic-appearing or diaphoretic.   Musculoskeletal:      Right knee: Normal.      Left knee: Swelling present. Decreased range of motion. Tenderness present over the medial joint line. Normal pulse.      Instability Tests: Anterior drawer test negative. Posterior drawer test negative. Anterior Lachman test  negative. Medial Tatianna test negative and lateral Tatianna test negative.      Left foot: Normal. Normal capillary refill. Normal pulse.   Neurological:      Mental Status: He is alert.        Diagnostics:  None     Medical Decision making and clinic course :  I personally reviewed prior external notes and test results pertinent to today's visit.   Shared decision-making was utilized with patient for treatment plan.  Discussed differentials with patient to include possible meniscal versus ACL sprain.  There is no overt laxity noted on exam.  Discussed obtaining x-ray in clinic although did discuss it would likely not be contributory to her diagnosis.  Patient is agreeable will refer to Ortho for follow-up.  Continue rest ice elevation Tylenol and or ibuprofen for pain.  The patient remained stable during the urgent care visit.    Plan:          1. Injury of left knee, initial encounter    - Referral to Orthopedics         All of the patient's questions were answered to their satisfaction at the time of discharge.    Follow up:        Patient was encouraged to monitor symptoms closely. Those signs and symptoms which would warrant concern and mandate seeking a higher level of service through the emergency department discussed at length and included in discharge papers.  Patient stated agreement and understanding of this plan of care.    Disposition:  Home in stable condition       Voice Recognition Disclaimer:  Portions of this document were created using voice recognition software. The software does have a chance of producing errors of grammar and possibly content. I have made every reasonable attempt to correct obvious errors, but there may be errors of grammar and possibly content that I did not discover before finalizing the documentation.    NUPUR Stratton.

## 2023-01-27 PROBLEM — S83.232A COMPLEX TEAR OF MEDIAL MENISCUS OF LEFT KNEE AS CURRENT INJURY: Status: ACTIVE | Noted: 2023-01-27

## 2023-11-10 ENCOUNTER — APPOINTMENT (OUTPATIENT)
Dept: CARDIOLOGY | Facility: MEDICAL CENTER | Age: 23
End: 2023-11-10
Attending: INTERNAL MEDICINE
Payer: COMMERCIAL

## 2024-12-04 ENCOUNTER — HOSPITAL ENCOUNTER (EMERGENCY)
Facility: MEDICAL CENTER | Age: 24
End: 2024-12-04
Attending: EMERGENCY MEDICINE
Payer: COMMERCIAL

## 2024-12-04 ENCOUNTER — APPOINTMENT (OUTPATIENT)
Dept: RADIOLOGY | Facility: MEDICAL CENTER | Age: 24
End: 2024-12-04
Attending: EMERGENCY MEDICINE
Payer: COMMERCIAL

## 2024-12-04 VITALS
OXYGEN SATURATION: 98 % | HEIGHT: 74 IN | HEART RATE: 75 BPM | DIASTOLIC BLOOD PRESSURE: 81 MMHG | TEMPERATURE: 98.3 F | BODY MASS INDEX: 26.23 KG/M2 | WEIGHT: 204.37 LBS | SYSTOLIC BLOOD PRESSURE: 123 MMHG | RESPIRATION RATE: 16 BRPM

## 2024-12-04 DIAGNOSIS — R07.9 CHEST PAIN, UNSPECIFIED TYPE: ICD-10-CM

## 2024-12-04 DIAGNOSIS — R20.2 PARESTHESIAS: ICD-10-CM

## 2024-12-04 LAB
ALBUMIN SERPL BCP-MCNC: 4.7 G/DL (ref 3.2–4.9)
ALBUMIN/GLOB SERPL: 1.5 G/DL
ALP SERPL-CCNC: 103 U/L (ref 30–99)
ALT SERPL-CCNC: 16 U/L (ref 2–50)
ANION GAP SERPL CALC-SCNC: 9 MMOL/L (ref 7–16)
AST SERPL-CCNC: 16 U/L (ref 12–45)
BASOPHILS # BLD AUTO: 0.6 % (ref 0–1.8)
BASOPHILS # BLD: 0.04 K/UL (ref 0–0.12)
BILIRUB SERPL-MCNC: 0.4 MG/DL (ref 0.1–1.5)
BUN SERPL-MCNC: 13 MG/DL (ref 8–22)
CALCIUM ALBUM COR SERPL-MCNC: 9 MG/DL (ref 8.5–10.5)
CALCIUM SERPL-MCNC: 9.6 MG/DL (ref 8.4–10.2)
CHLORIDE SERPL-SCNC: 105 MMOL/L (ref 96–112)
CO2 SERPL-SCNC: 25 MMOL/L (ref 20–33)
CREAT SERPL-MCNC: 0.77 MG/DL (ref 0.5–1.4)
D DIMER PPP IA.FEU-MCNC: <0.27 UG/ML (FEU) (ref 0–0.5)
EKG IMPRESSION: NORMAL
EOSINOPHIL # BLD AUTO: 0.21 K/UL (ref 0–0.51)
EOSINOPHIL NFR BLD: 3.1 % (ref 0–6.9)
ERYTHROCYTE [DISTWIDTH] IN BLOOD BY AUTOMATED COUNT: 38.5 FL (ref 35.9–50)
GFR SERPLBLD CREATININE-BSD FMLA CKD-EPI: 127 ML/MIN/1.73 M 2
GLOBULIN SER CALC-MCNC: 3.1 G/DL (ref 1.9–3.5)
GLUCOSE SERPL-MCNC: 92 MG/DL (ref 65–99)
HCT VFR BLD AUTO: 47 % (ref 42–52)
HGB BLD-MCNC: 17.3 G/DL (ref 14–18)
IMM GRANULOCYTES # BLD AUTO: 0.01 K/UL (ref 0–0.11)
IMM GRANULOCYTES NFR BLD AUTO: 0.1 % (ref 0–0.9)
LIPASE SERPL-CCNC: 23 U/L (ref 11–82)
LYMPHOCYTES # BLD AUTO: 2.37 K/UL (ref 1–4.8)
LYMPHOCYTES NFR BLD: 34.7 % (ref 22–41)
MAGNESIUM SERPL-MCNC: 2.1 MG/DL (ref 1.5–2.5)
MCH RBC QN AUTO: 30.5 PG (ref 27–33)
MCHC RBC AUTO-ENTMCNC: 36.8 G/DL (ref 32.3–36.5)
MCV RBC AUTO: 82.9 FL (ref 81.4–97.8)
MONOCYTES # BLD AUTO: 0.51 K/UL (ref 0–0.85)
MONOCYTES NFR BLD AUTO: 7.5 % (ref 0–13.4)
NEUTROPHILS # BLD AUTO: 3.69 K/UL (ref 1.82–7.42)
NEUTROPHILS NFR BLD: 54 % (ref 44–72)
NRBC # BLD AUTO: 0 K/UL
NRBC BLD-RTO: 0 /100 WBC (ref 0–0.2)
PLATELET # BLD AUTO: 198 K/UL (ref 164–446)
PMV BLD AUTO: 10.8 FL (ref 9–12.9)
POTASSIUM SERPL-SCNC: 4.5 MMOL/L (ref 3.6–5.5)
PROT SERPL-MCNC: 7.8 G/DL (ref 6–8.2)
RBC # BLD AUTO: 5.67 M/UL (ref 4.7–6.1)
SODIUM SERPL-SCNC: 139 MMOL/L (ref 135–145)
TROPONIN T SERPL-MCNC: <6 NG/L (ref 6–19)
TSH SERPL DL<=0.005 MIU/L-ACNC: 1.5 UIU/ML (ref 0.38–5.33)
WBC # BLD AUTO: 6.8 K/UL (ref 4.8–10.8)

## 2024-12-04 PROCEDURE — 83690 ASSAY OF LIPASE: CPT

## 2024-12-04 PROCEDURE — 85379 FIBRIN DEGRADATION QUANT: CPT

## 2024-12-04 PROCEDURE — 80053 COMPREHEN METABOLIC PANEL: CPT

## 2024-12-04 PROCEDURE — 83735 ASSAY OF MAGNESIUM: CPT

## 2024-12-04 PROCEDURE — 93005 ELECTROCARDIOGRAM TRACING: CPT | Mod: TC | Performed by: EMERGENCY MEDICINE

## 2024-12-04 PROCEDURE — 93005 ELECTROCARDIOGRAM TRACING: CPT | Mod: TC

## 2024-12-04 PROCEDURE — 99284 EMERGENCY DEPT VISIT MOD MDM: CPT

## 2024-12-04 PROCEDURE — 71045 X-RAY EXAM CHEST 1 VIEW: CPT

## 2024-12-04 PROCEDURE — 85025 COMPLETE CBC W/AUTO DIFF WBC: CPT

## 2024-12-04 PROCEDURE — 84443 ASSAY THYROID STIM HORMONE: CPT

## 2024-12-04 PROCEDURE — 84484 ASSAY OF TROPONIN QUANT: CPT

## 2024-12-04 PROCEDURE — 36415 COLL VENOUS BLD VENIPUNCTURE: CPT

## 2024-12-04 ASSESSMENT — HEART SCORE
RISK FACTORS: NO KNOWN RISK FACTORS
HEART SCORE: 0
TROPONIN: LESS THAN OR EQUAL TO NORMAL LIMIT
AGE: <45
ECG: NORMAL
HISTORY: SLIGHTLY SUSPICIOUS

## 2024-12-04 ASSESSMENT — PAIN DESCRIPTION - PAIN TYPE: TYPE: ACUTE PAIN

## 2024-12-04 NOTE — ED PROVIDER NOTES
ED Provider Note    CHIEF COMPLAINT  Chief Complaint   Patient presents with    Sent by MD     Was a cardiology and sent here for further evaluation due to 91% RA sat     Nausea    Chest Pain     Week on and off    this morning had pain to chest  pressure type pain   occ stabbing type pain  center of chest     Shortness of Breath     When chest pain happens  c/o SOB  hard to take a breath       Lump     Notice lump to R elbow area  for years   now found second on to abdomen  pea size   no pain        EXTERNAL RECORDS REVIEWED  Outpatient labs & studies EKG that accompanies the patient to the ER was reviewed.  No acute ST changes.  Cardiology also noted the patient had a normal echo in April 2022.    HPI/ROS  LIMITATION TO HISTORY   Select: : None    OUTSIDE HISTORIAN(S):  Parent mom and dad at the bedside and add to the history as below    George Quintanilla is a 24 y.o. male with a history of paroxysmal A-fib who presents for evaluation of low oxygen level at the cardiology office.    Patient went to the cardiologist today for chest pain he has been having for the last couple of months.  This was initially happening after marijuana use mainly.  He reports a pressure-like sensation.  This pressure was episodic and gradually became more constant and at times he would have sharp pain with deep inspiration only.  For the last 10 days the chest pain is now associated with bilateral tingling in his fingers and hands, mostly in the palmar areas and a metallic taste in his mouth.  He also gets shaky and feels dizzy/lightheaded.  He feels nauseous mainly when he wakes up and also reports fatigue.  The chest pain is worse with lying down and keeps him from falling asleep when he lays down.    Patient denies shortness of breath, vomiting, change in his bowel habits, fever, chills, hemoptysis, leg swelling, calf pain, syncope, orthopnea recent immobilization/surgery/travel, history of PE/DVT in self or family, history of  "aneurysms in self or family, back pain, weakness.    Patient also reports a lump in his right elbow area and right abdominal wall which she has made an appointment to see his PCP for next week.    PAST MEDICAL HISTORY   has a past medical history of Arrhythmia (2017).    SURGICAL HISTORY   has a past surgical history that includes knee scope,med or lat menis repair (Left, 3/15/2023).    FAMILY HISTORY  Aunt and uncle with asthma  Diabetes  No aneurysms or dissection  Grandfather with coronary artery disease in his 40s  No PE/DVT    SOCIAL HISTORY  Social History     Tobacco Use    Smoking status: Never    Smokeless tobacco: Never   Vaping Use    Vaping status: Never Used   Substance and Sexual Activity    Alcohol use: Yes     Comment: occ    Drug use: Yes     Types: Inhaled     Comment: ana rosa    Sexual activity: Not on file       CURRENT MEDICATIONS  Home Medications       Reviewed by Angelina Garcia R.N. (Registered Nurse) on 12/04/24 at 1242  Med List Status: Not Addressed     Medication Last Dose Status   ondansetron (ZOFRAN) 4 MG Tab tablet  Active                    ALLERGIES  No Known Allergies    PHYSICAL EXAM  VITAL SIGNS: /81   Pulse 75   Temp 36.8 °C (98.3 °F) (Temporal)   Resp 16   Ht 1.867 m (6' 1.5\")   Wt 92.7 kg (204 lb 5.9 oz)   SpO2 98%   BMI 26.60 kg/m²    General:  WDWN male, nontoxic appearing in NAD; A+Ox3; V/S as above;; afebrile, not hypoxic or tachycardic  Skin: warm and dry; good color; no rash  HEENT: NCAT; EOMs intact; PERRL; no scleral icterus   Neck: FROM; no LAD, no stridor  Cardiovascular: Regular heart rate and rhythm.  No murmurs, rubs, or gallops; pulses 2+ bilaterally radially  Lungs: No respiratory distress or tachypnea; Clear to auscultation with good air movement bilaterally.  No wheezes, rhonchi, or rales.   Abdomen: Right abdominal wall with a small, pea-sized, mobile rubbery nodule within the subcutaneous tissue  Extremities: GILLIAM x 4; no e/o trauma; no pedal " edema; neg Ruma's  Neurologic: CNs III-XII grossly intact; speech clear; distal sensation intact; strength 5/5 UE/LEs  Psychiatric: Appropriate affect, normal mood      EKG/LABS  Results for orders placed or performed during the hospital encounter of 24   EKG    Collection Time: 24 12:31 PM   Result Value Ref Range    Report       [facility] Emergency Dept.    Test Date:  2024  Pt Name:    SAMEER ENRIQUE                  Department: [facility]  MRN:        [id number]                      Room:  Gender:     Male                         Technician: [id number]  :        2000                   Requested By:ER TRIAGE PROTOCOL  Order #:    [id number]                    Reading MD: ANTONIA ARORA MD    Measurements  Intervals                                Axis  Rate:       58                           P:          23  IL:         129                          QRS:        94  QRSD:       104                          T:          37  QT:         385  QTc:        379    Interpretive Statements  Sinus rhythm  Borderline right axis deviation  Compared to ECG 2017 16:35:12  No significant changes  Electronically Signed On 2024 13:05:15 PST by ANTONIA ARORA MD     CBC WITH DIFFERENTIAL    Collection Time: 24  1:32 PM   Result Value Ref Range    WBC 6.8 4.8 - 10.8 K/uL    RBC 5.67 4.70 - 6.10 M/uL    Hemoglobin 17.3 14.0 - 18.0 g/dL    Hematocrit 47.0 42.0 - 52.0 %    MCV 82.9 81.4 - 97.8 fL    MCH 30.5 27.0 - 33.0 pg    MCHC 36.8 (H) 32.3 - 36.5 g/dL    RDW 38.5 35.9 - 50.0 fL    Platelet Count 198 164 - 446 K/uL    MPV 10.8 9.0 - 12.9 fL    Neutrophils-Polys 54.00 44.00 - 72.00 %    Lymphocytes 34.70 22.00 - 41.00 %    Monocytes 7.50 0.00 - 13.40 %    Eosinophils 3.10 0.00 - 6.90 %    Basophils 0.60 0.00 - 1.80 %    Immature Granulocytes 0.10 0.00 - 0.90 %    Nucleated RBC 0.00 0.00 - 0.20 /100 WBC    Neutrophils (Absolute) 3.69 1.82 - 7.42 K/uL    Lymphs (Absolute) 2.37  1.00 - 4.80 K/uL    Monos (Absolute) 0.51 0.00 - 0.85 K/uL    Eos (Absolute) 0.21 0.00 - 0.51 K/uL    Baso (Absolute) 0.04 0.00 - 0.12 K/uL    Immature Granulocytes (abs) 0.01 0.00 - 0.11 K/uL    NRBC (Absolute) 0.00 K/uL   CMP    Collection Time: 12/04/24  1:32 PM   Result Value Ref Range    Sodium 139 135 - 145 mmol/L    Potassium 4.5 3.6 - 5.5 mmol/L    Chloride 105 96 - 112 mmol/L    Co2 25 20 - 33 mmol/L    Anion Gap 9.0 7.0 - 16.0    Glucose 92 65 - 99 mg/dL    Bun 13 8 - 22 mg/dL    Creatinine 0.77 0.50 - 1.40 mg/dL    Calcium 9.6 8.4 - 10.2 mg/dL    Correct Calcium 9.0 8.5 - 10.5 mg/dL    AST(SGOT) 16 12 - 45 U/L    ALT(SGPT) 16 2 - 50 U/L    Alkaline Phosphatase 103 (H) 30 - 99 U/L    Total Bilirubin 0.4 0.1 - 1.5 mg/dL    Albumin 4.7 3.2 - 4.9 g/dL    Total Protein 7.8 6.0 - 8.2 g/dL    Globulin 3.1 1.9 - 3.5 g/dL    A-G Ratio 1.5 g/dL   LIPASE    Collection Time: 12/04/24  1:32 PM   Result Value Ref Range    Lipase 23 11 - 82 U/L   TROPONIN    Collection Time: 12/04/24  1:32 PM   Result Value Ref Range    Troponin T <6 6 - 19 ng/L   D-DIMER    Collection Time: 12/04/24  1:32 PM   Result Value Ref Range    D-Dimer <0.27 0.00 - 0.50 ug/mL (FEU)   ESTIMATED GFR    Collection Time: 12/04/24  1:32 PM   Result Value Ref Range    GFR (CKD-EPI) 127 >60 mL/min/1.73 m 2   MAGNESIUM    Collection Time: 12/04/24  1:35 PM   Result Value Ref Range    Magnesium 2.1 1.5 - 2.5 mg/dL   TSH WITH REFLEX TO FT4    Collection Time: 12/04/24  1:35 PM   Result Value Ref Range    TSH 1.500 0.380 - 5.330 uIU/mL       I have independently interpreted this EKG which shows no acute ST changes or A-fib.    RADIOLOGY/PROCEDURES   I have independently interpreted the diagnostic imaging associated with this visit and am waiting the final reading from the radiologist.   My preliminary interpretation is as follows:   Chest x-ray with no focal consolidation, pulmonary edema, or pleural effusion    Radiologist  interpretation:  DX-CHEST-PORTABLE (1 VIEW)   Final Result      No evidence of acute cardiopulmonary process.          COURSE & MEDICAL DECISION MAKING    ASSESSMENT, COURSE AND PLAN  Care Narrative: This is a 24-year-old with history of paroxysmal A-fib who is not in A-fib and not experiencing symptoms of this who presents from the cardiology office for evaluation of hypoxia to 91%.  He was at the cardiology office for chest pain which has been present for a couple of months and in the last 10 days has had more associated symptoms such as nausea, shaking, tingling in his fingers and hands, lightheadedness.  Patient denies circumoral or tingling in his lower extremities with this.      EKG shows no arrhythmia or acute ST changes.    Labs demonstrate a negative D-dimer making PE unlikely.  No evidence of anemia or electrolyte abnormality.  Thyroid labs normal.    Chest x-ray shows no widened mediastinum, cardiomegaly, pleural effusion, or pneumonia    Orthostatics requested.    Ambulatory pulse ox demonstrated normal oxygenation and heart rate with ambulation.  O2 sat 97 to 100% with heart rate 75.  No symptoms with this.    I advised the patient and his parents regarding his reassuring blood work results and chest x-ray.  We discussed the possibility of starting him on albuterol for possible reactive airways given marijuana use and exposure to chemicals at work as a  and or starting him on Prilosec.  We opted to just do the Prilosec to see if this would help possible esophagitis/GERD.  At this point we have undifferentiated chest pain but I do not suspect PE, aortic dissection, valvular heart disease, pericarditis, or ACS.    Heart score 0    FINAL DIAGNOSIS  1. Chest pain, unspecified type    2. Paresthesias      Electronically signed by: Lluvia Garvin M.D., 12/4/2024 1:04 PM

## 2024-12-04 NOTE — DISCHARGE INSTRUCTIONS
Take Prilosec 20 mg daily for at least 2 weeks to see if this improves your symptoms.    Return to the ER for worsening symptoms such as shortness of breath, vomiting, or other concerns    Follow-up with gastroenterology for possible endoscopy    See your primary care doctor as scheduled next week.  Ask about a sleep study and albuterol inhaler trial.    Continue to abstain from marijuana use

## 2024-12-04 NOTE — ED TRIAGE NOTES
"/81   Pulse 69   Temp 36.8 °C (98.3 °F) (Temporal)   Resp 16   Ht 1.867 m (6' 1.5\")   Wt 92.7 kg (204 lb 5.9 oz)   SpO2 96%   BMI 26.60 kg/m²   Chief Complaint   Patient presents with    Sent by MD     Was a cardiology and sent here for further evaluation due to 91% RA sat     Nausea    Chest Pain     Week on and off    this morning had pain to chest  pressure type pain   occ stabbing type pain  center of chest     Shortness of Breath     When chest pain happens  c/o SOB  hard to take a breath       Lump     Notice lump to R elbow area  for years   now found second on to abdomen  pea size   no pain      Here w/ parents   concerned pt was sent here to \"See if he had pericarditis.  That what the cardiologist PA is thinking\" due to all the complaints pt is having    EKG done in triage   "

## 2024-12-04 NOTE — ED NOTES
Patient able to ambulate without difficulty, heart rate remained at 75 bpm and his SPO2 ranged from 97% to 100%. Patient denied chest pain, light headedness, dizziness, or any other complaints during ambulation trial.

## 2024-12-30 ENCOUNTER — OFFICE VISIT (OUTPATIENT)
Dept: URGENT CARE | Facility: CLINIC | Age: 24
End: 2024-12-30
Payer: COMMERCIAL

## 2024-12-30 VITALS
DIASTOLIC BLOOD PRESSURE: 82 MMHG | HEART RATE: 100 BPM | RESPIRATION RATE: 18 BRPM | HEIGHT: 72 IN | TEMPERATURE: 99.9 F | SYSTOLIC BLOOD PRESSURE: 128 MMHG | OXYGEN SATURATION: 95 % | BODY MASS INDEX: 26.82 KG/M2 | WEIGHT: 198 LBS

## 2024-12-30 DIAGNOSIS — J98.8 RTI (RESPIRATORY TRACT INFECTION): ICD-10-CM

## 2024-12-30 DIAGNOSIS — J03.90 EXUDATIVE TONSILLITIS: ICD-10-CM

## 2024-12-30 PROBLEM — I48.0 PAF (PAROXYSMAL ATRIAL FIBRILLATION) (HCC): Status: ACTIVE | Noted: 2020-01-31

## 2024-12-30 PROBLEM — I47.10 SUPRAVENTRICULAR TACHYCARDIA (HCC): Status: ACTIVE | Noted: 2020-01-31

## 2024-12-30 LAB — S PYO DNA SPEC NAA+PROBE: NOT DETECTED

## 2024-12-30 RX ORDER — FAMOTIDINE 20 MG/1
20 TABLET, FILM COATED ORAL 2 TIMES DAILY
COMMUNITY
Start: 2024-12-12

## 2024-12-30 ASSESSMENT — ENCOUNTER SYMPTOMS
HEADACHES: 1
SORE THROAT: 1
FEVER: 1
COUGH: 1

## 2024-12-30 ASSESSMENT — FIBROSIS 4 INDEX: FIB4 SCORE: 0.51

## 2024-12-30 NOTE — PROGRESS NOTES
Subjective     George Vikash Quintanilla is a 24 y.o. male who presents with Pharyngitis (X 4 days/ swollen tonsils/ acute fever/ chest congestion/ nasal congestion/ headaches )    This is a  new problem with uncertain prognosis:   This is a very pleasant 24 y.o. who has come to the walk-in clinic today for 2 weeks ago had fevers aches and chills he thought he had the flu and was improving about 5 days ago developed sore throat and some fevers again and fevers have resolved but still having sore throat.  Some chest congestion and coughing but improving not much nasal congestion.          ALLERGIES:  Patient has no known allergies.     PMH:  Past Medical History:   Diagnosis Date    Arrhythmia 2017    SVT        PSH:  Past Surgical History:   Procedure Laterality Date    PB KNEE SCOPE,MED OR LAT MENIS REPAIR Left 3/15/2023    Procedure: LEFT KNEE ARTHROSCOPY, LEFT MEDIAL MENISCUS ROOT REPAIR,CHONDROPLASTY, REPAIRS AS INDICATED;  Surgeon: Pancho Leos M.D.;  Location: [facility];  Service: Orthopedics       MEDS:    Current Outpatient Medications:     famotidine (PEPCID) 20 MG Tab, Take 20 mg by mouth 2 times a day., Disp: , Rfl:     omeprazole (PRILOSEC) 20 MG delayed-release capsule, Take 1 Capsule by mouth every day., Disp: 30 Capsule, Rfl: 1    ondansetron (ZOFRAN) 4 MG Tab tablet, Take 1 Tablet by mouth every four hours as needed for Nausea/Vomiting., Disp: 8 Tablet, Rfl: 0    ** I have documented what I find to be significant in regards to past medical, social, family and surgical history  in my HPI or under PMH/PSH/FH review section, otherwise it is noncontributory **           HPI    Review of Systems   Constitutional:  Positive for fever.   HENT:  Positive for congestion and sore throat.    Respiratory:  Positive for cough.    Neurological:  Positive for headaches.   All other systems reviewed and are negative.             Objective     /82   Pulse 100   Temp 37.7 °C (99.9 °F)  (Temporal)   Resp 18   Ht 1.829 m (6')   Wt 89.8 kg (198 lb)   SpO2 95%   BMI 26.85 kg/m²      Physical Exam  Vitals and nursing note reviewed.   Constitutional:       General: He is not in acute distress.     Appearance: Normal appearance. He is well-developed. He is not ill-appearing, toxic-appearing or diaphoretic.   HENT:      Head: Normocephalic.      Mouth/Throat:      Mouth: Mucous membranes are moist.      Pharynx: Oropharyngeal exudate and posterior oropharyngeal erythema present.   Cardiovascular:      Rate and Rhythm: Normal rate and regular rhythm.      Heart sounds: Normal heart sounds.   Pulmonary:      Effort: Pulmonary effort is normal. No respiratory distress.      Breath sounds: Normal breath sounds. No wheezing, rhonchi or rales.   Musculoskeletal:      Cervical back: Tenderness present.   Lymphadenopathy:      Cervical: No cervical adenopathy.   Neurological:      Mental Status: He is alert.      Motor: No abnormal muscle tone.   Psychiatric:         Mood and Affect: Mood normal.         Behavior: Behavior normal.                             Assessment & Plan     1. RTI (respiratory tract infection)  POCT GROUP A STREP, PCR          - Dx, plan & d/c instructions discussed   - Rest, stay hydrated, OTC Motrin and/or Tylenol as needed      Follow up with your regular primary care providers office within a week to keep them updated and informed of this visit and for regular routine health maintenance check-ups. ER if not improving in 2-3 days or if feeling/getting worse. (If you do not have a primary care provider and need to schedule one you may call [facility] at [phone] to do this).    Patient left in stable condition     Discussed if any testing, labs or imaging studies are obtained outside of the [facility] facility, it is their responsibility to contact the Urgent Care and let us know that it was done and get us the results so adequate follow up can be initiated    Pertinent prior lab work  and/or imaging studies in Epic have been reviewed by me today on day of this visit and taken into account for my treatment and plan today    Pertinent PMH/PSH and/or chronic conditions and medications if any were reviewed today and taken into account for my treatment and plan today    Pertinent prior office visit notes in Epic have been reviewed by me today on day of this visit.    Please note that this dictation may have been created using voice recognition software, if so I have made every reasonable attempt to correct obvious errors, but I expect that there are errors of grammar and possibly content that I did not discover before finalizing the note.